# Patient Record
Sex: FEMALE | NOT HISPANIC OR LATINO | URBAN - METROPOLITAN AREA
[De-identification: names, ages, dates, MRNs, and addresses within clinical notes are randomized per-mention and may not be internally consistent; named-entity substitution may affect disease eponyms.]

---

## 2020-03-21 ENCOUNTER — INPATIENT (INPATIENT)
Facility: HOSPITAL | Age: 85
LOS: 2 days | Discharge: NURSING, REHAB OR LONG-TERM CARE | DRG: 66 | End: 2020-03-24
Attending: INTERNAL MEDICINE | Admitting: INTERNAL MEDICINE
Payer: MEDICARE

## 2020-03-21 VITALS
OXYGEN SATURATION: 96 % | HEIGHT: 65 IN | SYSTOLIC BLOOD PRESSURE: 131 MMHG | RESPIRATION RATE: 18 BRPM | WEIGHT: 139.99 LBS | HEART RATE: 68 BPM | TEMPERATURE: 97 F | DIASTOLIC BLOOD PRESSURE: 74 MMHG

## 2020-03-21 DIAGNOSIS — I63.311 CEREBRAL INFARCTION DUE TO THROMBOSIS OF RIGHT MIDDLE CEREBRAL ARTERY: ICD-10-CM

## 2020-03-21 LAB
ALBUMIN SERPL ELPH-MCNC: 3.1 G/DL — LOW (ref 3.5–5)
ALP SERPL-CCNC: 66 U/L — SIGNIFICANT CHANGE UP (ref 40–120)
ALT FLD-CCNC: 24 U/L DA — SIGNIFICANT CHANGE UP (ref 10–60)
ANION GAP SERPL CALC-SCNC: 6 MMOL/L — SIGNIFICANT CHANGE UP (ref 5–17)
AST SERPL-CCNC: 33 U/L — SIGNIFICANT CHANGE UP (ref 10–40)
BASOPHILS # BLD AUTO: 0.04 K/UL — SIGNIFICANT CHANGE UP (ref 0–0.2)
BASOPHILS NFR BLD AUTO: 0.4 % — SIGNIFICANT CHANGE UP (ref 0–2)
BILIRUB SERPL-MCNC: 1.1 MG/DL — SIGNIFICANT CHANGE UP (ref 0.2–1.2)
BUN SERPL-MCNC: 13 MG/DL — SIGNIFICANT CHANGE UP (ref 7–18)
CALCIUM SERPL-MCNC: 8.5 MG/DL — SIGNIFICANT CHANGE UP (ref 8.4–10.5)
CHLORIDE SERPL-SCNC: 101 MMOL/L — SIGNIFICANT CHANGE UP (ref 96–108)
CK SERPL-CCNC: 760 U/L — HIGH (ref 21–215)
CO2 SERPL-SCNC: 32 MMOL/L — HIGH (ref 22–31)
CREAT SERPL-MCNC: 0.76 MG/DL — SIGNIFICANT CHANGE UP (ref 0.5–1.3)
EOSINOPHIL # BLD AUTO: 0.01 K/UL — SIGNIFICANT CHANGE UP (ref 0–0.5)
EOSINOPHIL NFR BLD AUTO: 0.1 % — SIGNIFICANT CHANGE UP (ref 0–6)
GLUCOSE SERPL-MCNC: 91 MG/DL — SIGNIFICANT CHANGE UP (ref 70–99)
HCT VFR BLD CALC: 38.7 % — SIGNIFICANT CHANGE UP (ref 34.5–45)
HGB BLD-MCNC: 12.8 G/DL — SIGNIFICANT CHANGE UP (ref 11.5–15.5)
IMM GRANULOCYTES NFR BLD AUTO: 0.3 % — SIGNIFICANT CHANGE UP (ref 0–1.5)
LYMPHOCYTES # BLD AUTO: 1.9 K/UL — SIGNIFICANT CHANGE UP (ref 1–3.3)
LYMPHOCYTES # BLD AUTO: 17.3 % — SIGNIFICANT CHANGE UP (ref 13–44)
MCHC RBC-ENTMCNC: 31.4 PG — SIGNIFICANT CHANGE UP (ref 27–34)
MCHC RBC-ENTMCNC: 33.1 GM/DL — SIGNIFICANT CHANGE UP (ref 32–36)
MCV RBC AUTO: 94.9 FL — SIGNIFICANT CHANGE UP (ref 80–100)
MONOCYTES # BLD AUTO: 1.28 K/UL — HIGH (ref 0–0.9)
MONOCYTES NFR BLD AUTO: 11.7 % — SIGNIFICANT CHANGE UP (ref 2–14)
NEUTROPHILS # BLD AUTO: 7.7 K/UL — HIGH (ref 1.8–7.4)
NEUTROPHILS NFR BLD AUTO: 70.2 % — SIGNIFICANT CHANGE UP (ref 43–77)
NRBC # BLD: 0 /100 WBCS — SIGNIFICANT CHANGE UP (ref 0–0)
PLATELET # BLD AUTO: 145 K/UL — LOW (ref 150–400)
POTASSIUM SERPL-MCNC: 4 MMOL/L — SIGNIFICANT CHANGE UP (ref 3.5–5.3)
POTASSIUM SERPL-SCNC: 4 MMOL/L — SIGNIFICANT CHANGE UP (ref 3.5–5.3)
PROT SERPL-MCNC: 6.7 G/DL — SIGNIFICANT CHANGE UP (ref 6–8.3)
RBC # BLD: 4.08 M/UL — SIGNIFICANT CHANGE UP (ref 3.8–5.2)
RBC # FLD: 14.7 % — HIGH (ref 10.3–14.5)
SODIUM SERPL-SCNC: 139 MMOL/L — SIGNIFICANT CHANGE UP (ref 135–145)
WBC # BLD: 10.96 K/UL — HIGH (ref 3.8–10.5)
WBC # FLD AUTO: 10.96 K/UL — HIGH (ref 3.8–10.5)

## 2020-03-21 PROCEDURE — 71045 X-RAY EXAM CHEST 1 VIEW: CPT | Mod: 26

## 2020-03-21 PROCEDURE — 99285 EMERGENCY DEPT VISIT HI MDM: CPT

## 2020-03-21 PROCEDURE — 70450 CT HEAD/BRAIN W/O DYE: CPT | Mod: 26

## 2020-03-21 RX ORDER — ENOXAPARIN SODIUM 100 MG/ML
40 INJECTION SUBCUTANEOUS DAILY
Refills: 0 | Status: DISCONTINUED | OUTPATIENT
Start: 2020-03-21 | End: 2020-03-24

## 2020-03-21 NOTE — ED ADULT NURSE REASSESSMENT NOTE - NS ED NURSE REASSESS COMMENT FT1
No nursing intervention provided by documenting RN. Charge nurse Saran made aware that pt was not seen in timely manner due to load of RN patients assignment. Pt endorsed to nurse Hong. at 1900

## 2020-03-21 NOTE — ED PROVIDER NOTE - OBJECTIVE STATEMENT
85 y.o female with no PMHx and no PSHx presents to the ED c/o R arm and R leg weakness since yesterday with difficulty walking. Patient's relative called 911 yesterday and patient refused to go and then continued to be weak and then today she agreed to come to ED. Patient denies any headache, chest pain, shortness of breath, paresthesias or any other acute complaints. NKDA

## 2020-03-21 NOTE — ED PROVIDER NOTE - CLINICAL SUMMARY MEDICAL DECISION MAKING FREE TEXT BOX
Patient with stroke requires inpatient admission for evaluation . No TPA candidate due to onset more than 24 hours.

## 2020-03-21 NOTE — ED ADULT TRIAGE NOTE - CHIEF COMPLAINT QUOTE
biba s/p found on the floor unknown time . pt reports cannot remember event c/o weakness Rt side , noted w/ mild slurring

## 2020-03-21 NOTE — ED ADULT NURSE NOTE - NSIMPLEMENTINTERV_GEN_ALL_ED
Implemented All Universal Safety Interventions:  [place] to call system. Call bell, personal items and telephone within reach. Instruct patient to call for assistance. Room bathroom lighting operational. Non-slip footwear when patient is off stretcher. Physically safe environment: no spills, clutter or unnecessary equipment. Stretcher in lowest position, wheels locked, appropriate side rails in place.

## 2020-03-21 NOTE — ED PROVIDER NOTE - CARE PLAN
Principal Discharge DX:	Cerebrovascular accident (CVA) due to thrombosis of right middle cerebral artery

## 2020-03-22 DIAGNOSIS — D72.829 ELEVATED WHITE BLOOD CELL COUNT, UNSPECIFIED: ICD-10-CM

## 2020-03-22 DIAGNOSIS — I63.311 CEREBRAL INFARCTION DUE TO THROMBOSIS OF RIGHT MIDDLE CEREBRAL ARTERY: ICD-10-CM

## 2020-03-22 DIAGNOSIS — Z98.49 CATARACT EXTRACTION STATUS, UNSPECIFIED EYE: Chronic | ICD-10-CM

## 2020-03-22 DIAGNOSIS — G62.9 POLYNEUROPATHY, UNSPECIFIED: ICD-10-CM

## 2020-03-22 DIAGNOSIS — I10 ESSENTIAL (PRIMARY) HYPERTENSION: ICD-10-CM

## 2020-03-22 DIAGNOSIS — E78.5 HYPERLIPIDEMIA, UNSPECIFIED: ICD-10-CM

## 2020-03-22 DIAGNOSIS — Z98.890 OTHER SPECIFIED POSTPROCEDURAL STATES: Chronic | ICD-10-CM

## 2020-03-22 DIAGNOSIS — M19.90 UNSPECIFIED OSTEOARTHRITIS, UNSPECIFIED SITE: ICD-10-CM

## 2020-03-22 DIAGNOSIS — Z29.9 ENCOUNTER FOR PROPHYLACTIC MEASURES, UNSPECIFIED: ICD-10-CM

## 2020-03-22 LAB
ANION GAP SERPL CALC-SCNC: 12 MMOL/L — SIGNIFICANT CHANGE UP (ref 5–17)
ANISOCYTOSIS BLD QL: SLIGHT — SIGNIFICANT CHANGE UP
BASOPHILS # BLD AUTO: 0 K/UL — SIGNIFICANT CHANGE UP (ref 0–0.2)
BASOPHILS NFR BLD AUTO: 0 % — SIGNIFICANT CHANGE UP (ref 0–2)
BUN SERPL-MCNC: 12 MG/DL — SIGNIFICANT CHANGE UP (ref 7–18)
CALCIUM SERPL-MCNC: 8.6 MG/DL — SIGNIFICANT CHANGE UP (ref 8.4–10.5)
CHLORIDE SERPL-SCNC: 101 MMOL/L — SIGNIFICANT CHANGE UP (ref 96–108)
CHOLEST SERPL-MCNC: 171 MG/DL — SIGNIFICANT CHANGE UP (ref 10–199)
CO2 SERPL-SCNC: 28 MMOL/L — SIGNIFICANT CHANGE UP (ref 22–31)
CREAT SERPL-MCNC: 0.79 MG/DL — SIGNIFICANT CHANGE UP (ref 0.5–1.3)
EOSINOPHIL # BLD AUTO: 0.08 K/UL — SIGNIFICANT CHANGE UP (ref 0–0.5)
EOSINOPHIL NFR BLD AUTO: 1 % — SIGNIFICANT CHANGE UP (ref 0–6)
FERRITIN SERPL-MCNC: 65 NG/ML — SIGNIFICANT CHANGE UP (ref 15–150)
FOLATE SERPL-MCNC: 10.3 NG/ML — SIGNIFICANT CHANGE UP
GLUCOSE SERPL-MCNC: 86 MG/DL — SIGNIFICANT CHANGE UP (ref 70–99)
HBA1C BLD-MCNC: 6.3 % — HIGH (ref 4–5.6)
HCT VFR BLD CALC: 38.5 % — SIGNIFICANT CHANGE UP (ref 34.5–45)
HDLC SERPL-MCNC: 70 MG/DL — SIGNIFICANT CHANGE UP
HGB BLD-MCNC: 12.5 G/DL — SIGNIFICANT CHANGE UP (ref 11.5–15.5)
IRON SATN MFR SERPL: 28 % — SIGNIFICANT CHANGE UP (ref 15–50)
IRON SATN MFR SERPL: 83 UG/DL — SIGNIFICANT CHANGE UP (ref 40–150)
LIPID PNL WITH DIRECT LDL SERPL: 85 MG/DL — SIGNIFICANT CHANGE UP
LYMPHOCYTES # BLD AUTO: 1.94 K/UL — SIGNIFICANT CHANGE UP (ref 1–3.3)
LYMPHOCYTES # BLD AUTO: 23 % — SIGNIFICANT CHANGE UP (ref 13–44)
MAGNESIUM SERPL-MCNC: 2.3 MG/DL — SIGNIFICANT CHANGE UP (ref 1.6–2.6)
MANUAL SMEAR VERIFICATION: SIGNIFICANT CHANGE UP
MCHC RBC-ENTMCNC: 30.7 PG — SIGNIFICANT CHANGE UP (ref 27–34)
MCHC RBC-ENTMCNC: 32.5 GM/DL — SIGNIFICANT CHANGE UP (ref 32–36)
MCV RBC AUTO: 94.6 FL — SIGNIFICANT CHANGE UP (ref 80–100)
MICROCYTES BLD QL: SLIGHT — SIGNIFICANT CHANGE UP
MONOCYTES # BLD AUTO: 0.93 K/UL — HIGH (ref 0–0.9)
MONOCYTES NFR BLD AUTO: 11 % — SIGNIFICANT CHANGE UP (ref 2–14)
NEUTROPHILS # BLD AUTO: 5.32 K/UL — SIGNIFICANT CHANGE UP (ref 1.8–7.4)
NEUTROPHILS NFR BLD AUTO: 63 % — SIGNIFICANT CHANGE UP (ref 43–77)
NRBC # BLD: 0 /100 — SIGNIFICANT CHANGE UP (ref 0–0)
OVALOCYTES BLD QL SMEAR: SLIGHT — SIGNIFICANT CHANGE UP
PHOSPHATE SERPL-MCNC: 2.9 MG/DL — SIGNIFICANT CHANGE UP (ref 2.5–4.5)
PLAT MORPH BLD: NORMAL — SIGNIFICANT CHANGE UP
PLATELET # BLD AUTO: 160 K/UL — SIGNIFICANT CHANGE UP (ref 150–400)
POIKILOCYTOSIS BLD QL AUTO: SLIGHT — SIGNIFICANT CHANGE UP
POTASSIUM SERPL-MCNC: 3.6 MMOL/L — SIGNIFICANT CHANGE UP (ref 3.5–5.3)
POTASSIUM SERPL-SCNC: 3.6 MMOL/L — SIGNIFICANT CHANGE UP (ref 3.5–5.3)
RBC # BLD: 4.07 M/UL — SIGNIFICANT CHANGE UP (ref 3.8–5.2)
RBC # FLD: 14.9 % — HIGH (ref 10.3–14.5)
RBC BLD AUTO: ABNORMAL
SCHISTOCYTES BLD QL AUTO: SLIGHT — SIGNIFICANT CHANGE UP
SODIUM SERPL-SCNC: 141 MMOL/L — SIGNIFICANT CHANGE UP (ref 135–145)
T4 AB SER-ACNC: 10 UG/DL — SIGNIFICANT CHANGE UP (ref 4.6–12)
TIBC SERPL-MCNC: 292 UG/DL — SIGNIFICANT CHANGE UP (ref 250–450)
TOTAL CHOLESTEROL/HDL RATIO MEASUREMENT: 2.4 RATIO — LOW (ref 3.3–7.1)
TRIGL SERPL-MCNC: 78 MG/DL — SIGNIFICANT CHANGE UP (ref 10–149)
TROPONIN I SERPL-MCNC: <0.015 NG/ML — SIGNIFICANT CHANGE UP (ref 0–0.04)
TSH SERPL-MCNC: 1.36 UU/ML — SIGNIFICANT CHANGE UP (ref 0.34–4.82)
UIBC SERPL-MCNC: 209 UG/DL — SIGNIFICANT CHANGE UP (ref 110–370)
VARIANT LYMPHS # BLD: 2 % — SIGNIFICANT CHANGE UP (ref 0–6)
VIT B12 SERPL-MCNC: 329 PG/ML — SIGNIFICANT CHANGE UP (ref 232–1245)
WBC # BLD: 8.44 K/UL — SIGNIFICANT CHANGE UP (ref 3.8–10.5)
WBC # FLD AUTO: 8.44 K/UL — SIGNIFICANT CHANGE UP (ref 3.8–10.5)

## 2020-03-22 PROCEDURE — 99222 1ST HOSP IP/OBS MODERATE 55: CPT

## 2020-03-22 RX ORDER — ASPIRIN/CALCIUM CARB/MAGNESIUM 324 MG
81 TABLET ORAL DAILY
Refills: 0 | Status: DISCONTINUED | OUTPATIENT
Start: 2020-03-22 | End: 2020-03-24

## 2020-03-22 RX ORDER — ACETAMINOPHEN 500 MG
650 TABLET ORAL EVERY 6 HOURS
Refills: 0 | Status: DISCONTINUED | OUTPATIENT
Start: 2020-03-22 | End: 2020-03-24

## 2020-03-22 RX ORDER — ALBUTEROL 90 UG/1
2 AEROSOL, METERED ORAL EVERY 6 HOURS
Refills: 0 | Status: DISCONTINUED | OUTPATIENT
Start: 2020-03-22 | End: 2020-03-24

## 2020-03-22 RX ORDER — HYDRALAZINE HCL 50 MG
10 TABLET ORAL ONCE
Refills: 0 | Status: COMPLETED | OUTPATIENT
Start: 2020-03-22 | End: 2020-03-22

## 2020-03-22 RX ORDER — CLOPIDOGREL BISULFATE 75 MG/1
75 TABLET, FILM COATED ORAL DAILY
Refills: 0 | Status: DISCONTINUED | OUTPATIENT
Start: 2020-03-22 | End: 2020-03-24

## 2020-03-22 RX ORDER — ATORVASTATIN CALCIUM 80 MG/1
80 TABLET, FILM COATED ORAL AT BEDTIME
Refills: 0 | Status: DISCONTINUED | OUTPATIENT
Start: 2020-03-22 | End: 2020-03-24

## 2020-03-22 RX ADMIN — ATORVASTATIN CALCIUM 80 MILLIGRAM(S): 80 TABLET, FILM COATED ORAL at 21:56

## 2020-03-22 RX ADMIN — Medication 10 MILLIGRAM(S): at 20:57

## 2020-03-22 RX ADMIN — ENOXAPARIN SODIUM 40 MILLIGRAM(S): 100 INJECTION SUBCUTANEOUS at 12:05

## 2020-03-22 RX ADMIN — Medication 650 MILLIGRAM(S): at 22:47

## 2020-03-22 RX ADMIN — ALBUTEROL 2 PUFF(S): 90 AEROSOL, METERED ORAL at 12:04

## 2020-03-22 RX ADMIN — CLOPIDOGREL BISULFATE 75 MILLIGRAM(S): 75 TABLET, FILM COATED ORAL at 12:04

## 2020-03-22 RX ADMIN — Medication 650 MILLIGRAM(S): at 23:00

## 2020-03-22 RX ADMIN — Medication 81 MILLIGRAM(S): at 12:04

## 2020-03-22 NOTE — PHYSICAL THERAPY INITIAL EVALUATION ADULT - GENERAL OBSERVATIONS, REHAB EVAL
Consult received,EMR, radiology and labs reviewed. Patient received supine in bed, NAD, right hand  weakness. Patient agreed to EVALUATION from Physical Therapist.

## 2020-03-22 NOTE — H&P ADULT - NEUROLOGICAL DETAILS
sensation intact/deep reflexes intact/cranial nerves intact/alert and oriented x 3/responds to verbal commands

## 2020-03-22 NOTE — CONSULT NOTE ADULT - SUBJECTIVE AND OBJECTIVE BOX
Patient is a 85y old  Female who presents with a chief complaint of Right Hand Weakness (22 Mar 2020 00:34)      HPI: About 3 dyas prior to presenting to the ED she noticed right arm numbness and weakness; it did not improve and kept her from sleep. She denies headache chest pain  At baseline she uses a walker because of "neuropathy' in th elegs.  PAST MEDICAL & SURGICAL HISTORY:  Hyperlipidemia  Hypertension  Neuropathy  Arthritis  S/P excision of lipoma: Right Shoulder  S/P cataract surgery: Right Eye      FAMILY HISTORY:  FH: coronary artery disease: Father  FH: arthritis: Father, Mother        Social Hx:  Nonsmoker, no drug or alcohol use    MEDICATIONS  (STANDING):  aspirin  chewable 81 milliGRAM(s) Oral daily  atorvastatin 80 milliGRAM(s) Oral at bedtime  clopidogrel Tablet 75 milliGRAM(s) Oral daily  enoxaparin Injectable 40 milliGRAM(s) SubCutaneous daily       Allergies    No Known Allergies    Intolerances        ROS: Pertinent positives in HPI, all other ROS were reviewed and are negative.      Vital Signs Last 24 Hrs  T(C): 36.7 (22 Mar 2020 11:41), Max: 36.7 (22 Mar 2020 05:34)  T(F): 98 (22 Mar 2020 11:41), Max: 98 (22 Mar 2020 05:34)  HR: 85 (22 Mar 2020 11:41) (62 - 94)  BP: 126/64 (22 Mar 2020 11:41) (126/64 - 150/58)  BP(mean): --  RR: 18 (22 Mar 2020 11:41) (18 - 18)  SpO2: 98% (22 Mar 2020 11:41) (96% - 100%)        Constitutional: awake and alert.  HEENT: PERRLA, EOMI,   Neck: Supple.  Respiratory: Breath sounds are clear bilaterally  Cardiovascular: S1 and S2, regular rhythm  Gastrointestinal: soft, nontender  Extremities:  no edema  Vascular: Carotid Bruit - no  Musculoskeletal: no joint swelling/tenderness, no abnormal movements  Skin: No rashes    Neurological exam:  HF: A x O x 3. Appropriately interactive, normal affect. Speech fluent, No Aphasia or paraphasic errors. Naming /repetition intact   CN: PAMELA, EOMI, VFF, facial sensation normal, no NLFD, tongue midline, Palate moves equally, SCM equal bilaterally  Motor: No pronator drift, Strength 5/5 in all 4 ext, normal bulk and tone, no tremor, rigidity or bradykinesia.    Sens: I diminished slightly RUE and intact elshere to LT/ PP/ JS  weak VS in toes ankles  Reflexes: Symmetric and normal . BJ 2+, BR 2+,Left KJ 2+, Right KJ 0; bilateral AJ 0+, downgoing toes b/l  Coord:  No FNFA, dysmetria, ASHLEY intact   Gait/Balance: assisted with walker and 1     NIHSS: 7    1A: Level of consciousness       0= Alert; keenly responsive        1B: Ask month and age       0= Both questions right       1C: "Blink eyes" and "Squeeze Hands"       0= Performs both         2: Horizontal EOMs       0= Normal      3: Visual fields       0= No visual loss      4: Facial palsy (use grimace if obtunded)       +1= Minor paralysis (flat NLF, smile asymmetry)        5A: Left arm motor drift (count out loud and use fingers to show count)       0= No drift x 10 seconds         5B: Right arm motor drift        +1= Drift but doesn't hit bed    6A: Left leg motor drift       0= No drift x 10 seconds       +1= Drift but doesn't hit bed    6B: Right leg motor drift       +2= Drift, hits bed       7: Limb ataxia (FNF/heel-shin)       0= No ataxia    8: Sensation       +1= mild-moderate loss: less sharp/more dull     9: Language/aphasia- describe the scene (on citlali); name the items; read the sentences (on citlali)       0= Normal, no aphasia      10: Dysarthria- read the words       0= Normal      11: Extinction/inattention       0= No abnormality       MRS 3    Labs:                        12.5   8.44  )-----------( 160      ( 22 Mar 2020 07:05 )             38.5     03-22    141  |  101  |  12  ----------------------------<  86  3.6   |  28  |  0.79    Ca    8.6      22 Mar 2020 07:05  Phos  2.9     03-22  Mg     2.3     03-22    TPro  6.7  /  Alb  3.1<L>  /  TBili  1.1  /  DBili  x   /  AST  33  /  ALT  24  /  AlkPhos  66  03-21 03-22 Chol 171 LDL 85 HDL 70 Trig 78      Radiology report:  - CT head:  < from: CT Head No Cont (03.21.20 @ 16:35) >  EXAM:  CT BRAIN                            PROCEDURE DATE:  03/21/2020          INTERPRETATION:  INDICATION:  Altered mental status  TECHNIQUE:  A non contrast 2.5mm axial CT study of the brain was performed from skull base to vertex. Coronal and sagittal reformations were generated from the axial data.  COMPARISON EXAMINATION:  no prior.    FINDINGS:      HEMISPHERES:An acute small left MCA infarct is noted, with focal thrombus in a opercular branch but without thrombus in the left M1 or proximal M2 region. Otherwise there are scattered chronic ischemic changes in both hemispheres with involutional change. VENTRICLES:  Midline and normal in size.  POSTERIOR FOSSA:  The brain stem and cerebellum are unremarkable.  No CP angle lesion noted.  EXTRACEREBRAL SPACES:  No subdural or epidural collections are noted.  SKULL BASE AND CALVARIUM:  Appears intact.  No fracture or destructive lesion is identified.  SINUSES AND MASTOIDS:  Clear.  MISCELLANEOUS:  No orbital or suprasellar abnormality noted.      IMPRESSION:    1)  small acute left MCA territory infarct in the left frontal region, with focal thrombus in a single frontal opercular branch.  2)  no intracerebral hemorrhage or space-occupying lesion noted. Mild underlying chronic ischemic changes.    Follow-up MRI and MRA imaging may be considered for further assessment.    Dr. Lopez notified at 4:40 PM..        ALLA BHANDARI M.D., ATTENDING RADIOLOGIST  This document has been electronically signed. Mar 21 2020  4:40PM    < end of copied text >

## 2020-03-22 NOTE — H&P ADULT - PROBLEM SELECTOR PLAN 1
Right Wrist Drop   NIHSS: 7  Outside of tPA window  small acute left MCA territory infarct in the left frontal region, with focal thrombus in a single frontal opercular branch  f/u ECHO [ ] Carotid Doppler [ ]  f/u PT  MRI Screening in Chart   Consult: Neurology   c/w Aspirin, Plavix, and Atorvastatin Right Wrist Drop   NIHSS: 7  Outside of tPA window  small acute left MCA territory infarct in the left frontal region, with focal thrombus in a single frontal opercular branch  f/u ECHO [ ] Carotid Doppler [ ]  f/u PT  MRI Screening in Chart   c/w Aspirin, Plavix, and Atorvastatin

## 2020-03-22 NOTE — PHYSICAL THERAPY INITIAL EVALUATION ADULT - WORK/LEISURE ACTIVITY, REHAB EVAL
Cyclobenzaprine Approved    Prior Authorization Number: [id number]  Dates of approval: July 27, 2018 to July 27, 2019  Filling Pharmacy: [facility]  Additional Information: Per pharmacy, received payable claim and will notify patient when ready for .       independent

## 2020-03-22 NOTE — H&P ADULT - HISTORY OF PRESENT ILLNESS
Patient is an 85F that lives alone in her apartment that utilizes a walker with a PMHx of Arthritis, Neuropathy, Hypertension, and Hyperlipidemia and PSurgHx of Right Cataract surgery and s/p Right shoulder Lipoma presenting with right hand weakness. Patient states 3 days prior to admission she was in her usual state of health until she noticed that her right hand went limp and that she was unable to feel her hand. Her niece came to visit her and asked that she go to the doctor and the patient refused. Over the course of the night she was having difficulty sleeping because her hand strength and mobility was not improving and the following day she felt it would be appropriate to go to the hospital. Patient denies headaches, dizzines, chest pain, dysuria, n/v/d/c, abdominal pain, myalgias, and vision impairment.     NIHSS: 7    GOC: DNR/DNI

## 2020-03-22 NOTE — CONSULT NOTE ADULT - ASSESSMENT
A/P: Acute left MCA ischemic infarct due to thrombus or embolus; She also has weakness in the left leg with brisk reflexes that has been called "peripheral neuropathy" and has been using a walker for some time; The absent right knee and ankle reflex may reflect neuropathy or lumbar stenosis. The left side knee reflex is brisk that likely represents a normal reflex particularly in view of the normal plantar reflex. All the leg problems that are chronic and may be followed up in the outpatient office of a neurologist, but she does also have an acute ischemic infarct that began 3 days ago    - No IV tpa given because time of onset was 3 days prior to presentation  - ASA/ PLavix  - Statin  - DVT prophylaxis  - PT eval    Total Critical Care Time spent: 35 minutes

## 2020-03-22 NOTE — H&P ADULT - PROBLEM SELECTOR PLAN 7
IMPROVE VTE Individual Risk Assessment          RISK                                                          Points  [  ] Previous VTE                                                3  [  ] Thrombophilia                                             2  [  ] Lower limb paralysis                                   2        (unable to hold up >15 seconds)    [  ] Current Cancer                                             2         (within 6 months)  [  ] Immobilization > 24 hrs                              1  [  ] ICU/CCU stay > 24 hours                             1  [  ] Age > 60                                                         1    IMPROVE VTE Score: 4    Lovenox 40 mg s.c.

## 2020-03-22 NOTE — PHYSICAL THERAPY INITIAL EVALUATION ADULT - ACTIVE RANGE OF MOTION EXAMINATION, REHAB EVAL
bilateral upper extremity Active ROM was WFL (within functional limits)/bilateral  lower extremity Active ROM was WFL (within functional limits)/grossly assessed WFL AROM except Right wrist AAROM

## 2020-03-22 NOTE — H&P ADULT - NSHPPHYSICALEXAM_GEN_ALL_CORE
ICU Vital Signs Last 24 Hrs  T(C): 36.6 (22 Mar 2020 00:43), Max: 36.6 (22 Mar 2020 00:43)  T(F): 97.9 (22 Mar 2020 00:43), Max: 97.9 (22 Mar 2020 00:43)  HR: 94 (22 Mar 2020 00:43) (68 - 94)  BP: 140/59 (22 Mar 2020 00:43) (131/74 - 140/59)  BP(mean): --  ABP: --  ABP(mean): --  RR: 18 (22 Mar 2020 00:43) (18 - 18)  SpO2: 100% (22 Mar 2020 00:43) (96% - 100%)

## 2020-03-22 NOTE — H&P ADULT - ASSESSMENT
Patient is an 85F that lives alone in her apartment that utilizes a walker with a PMHx of Arthritis, Neuropathy, Hypertension, and Hyperlipidemia and PSurgHx of Right Cataract surgery and s/p Right shoulder Lipoma presenting with right hand weakness.    GOC: DNR/DNI    NIHSS: 7    ED    Creatinine Kinase: 760  WBC: 10.96  CT Head  1)  small acute left MCA territory infarct in the left frontal region, with focal thrombus in a single frontal opercular branch.

## 2020-03-22 NOTE — H&P ADULT - NSHPSOCIALHISTORY_GEN_ALL_CORE
20 years 1 ppd, Currently not smoking. No alcohol or drugs.     Patient lives alone in an apartment and utilizes a walker.     HCP: Jessie Islas (Cousin - Doctor)    GOC: DNR/DNI

## 2020-03-23 LAB
ANION GAP SERPL CALC-SCNC: 7 MMOL/L — SIGNIFICANT CHANGE UP (ref 5–17)
BASOPHILS # BLD AUTO: SIGNIFICANT CHANGE UP K/UL (ref 0–0.2)
BASOPHILS NFR BLD AUTO: SIGNIFICANT CHANGE UP % (ref 0–2)
BUN SERPL-MCNC: 16 MG/DL — SIGNIFICANT CHANGE UP (ref 7–18)
CALCIUM SERPL-MCNC: 8.3 MG/DL — LOW (ref 8.4–10.5)
CHLORIDE SERPL-SCNC: 103 MMOL/L — SIGNIFICANT CHANGE UP (ref 96–108)
CO2 SERPL-SCNC: 30 MMOL/L — SIGNIFICANT CHANGE UP (ref 22–31)
CREAT SERPL-MCNC: 0.72 MG/DL — SIGNIFICANT CHANGE UP (ref 0.5–1.3)
EOSINOPHIL # BLD AUTO: SIGNIFICANT CHANGE UP K/UL (ref 0–0.5)
EOSINOPHIL NFR BLD AUTO: SIGNIFICANT CHANGE UP % (ref 0–6)
GLUCOSE SERPL-MCNC: 90 MG/DL — SIGNIFICANT CHANGE UP (ref 70–99)
HCT VFR BLD CALC: 36.2 % — SIGNIFICANT CHANGE UP (ref 34.5–45)
HGB BLD-MCNC: 11.7 G/DL — SIGNIFICANT CHANGE UP (ref 11.5–15.5)
IMM GRANULOCYTES NFR BLD AUTO: SIGNIFICANT CHANGE UP % (ref 0–1.5)
LYMPHOCYTES # BLD AUTO: SIGNIFICANT CHANGE UP % (ref 13–44)
LYMPHOCYTES # BLD AUTO: SIGNIFICANT CHANGE UP K/UL (ref 1–3.3)
MAGNESIUM SERPL-MCNC: 2.4 MG/DL — SIGNIFICANT CHANGE UP (ref 1.6–2.6)
MCHC RBC-ENTMCNC: 30.6 PG — SIGNIFICANT CHANGE UP (ref 27–34)
MCHC RBC-ENTMCNC: 32.3 GM/DL — SIGNIFICANT CHANGE UP (ref 32–36)
MCV RBC AUTO: 94.8 FL — SIGNIFICANT CHANGE UP (ref 80–100)
MONOCYTES # BLD AUTO: SIGNIFICANT CHANGE UP K/UL (ref 0–0.9)
MONOCYTES NFR BLD AUTO: SIGNIFICANT CHANGE UP % (ref 2–14)
NEUTROPHILS # BLD AUTO: SIGNIFICANT CHANGE UP K/UL (ref 1.8–7.4)
NEUTROPHILS NFR BLD AUTO: SIGNIFICANT CHANGE UP % (ref 43–77)
NRBC # BLD: 0 /100 WBCS — SIGNIFICANT CHANGE UP (ref 0–0)
PHOSPHATE SERPL-MCNC: 3.1 MG/DL — SIGNIFICANT CHANGE UP (ref 2.5–4.5)
PLATELET # BLD AUTO: 152 K/UL — SIGNIFICANT CHANGE UP (ref 150–400)
POTASSIUM SERPL-MCNC: 2.9 MMOL/L — CRITICAL LOW (ref 3.5–5.3)
POTASSIUM SERPL-SCNC: 2.9 MMOL/L — CRITICAL LOW (ref 3.5–5.3)
RBC # BLD: 3.82 M/UL — SIGNIFICANT CHANGE UP (ref 3.8–5.2)
RBC # FLD: 15 % — HIGH (ref 10.3–14.5)
SODIUM SERPL-SCNC: 140 MMOL/L — SIGNIFICANT CHANGE UP (ref 135–145)
WBC # BLD: 7.62 K/UL — SIGNIFICANT CHANGE UP (ref 3.8–10.5)
WBC # FLD AUTO: 7.62 K/UL — SIGNIFICANT CHANGE UP (ref 3.8–10.5)

## 2020-03-23 PROCEDURE — 93880 EXTRACRANIAL BILAT STUDY: CPT | Mod: 26

## 2020-03-23 RX ORDER — CLOPIDOGREL BISULFATE 75 MG/1
1 TABLET, FILM COATED ORAL
Qty: 30 | Refills: 0
Start: 2020-03-23 | End: 2020-04-21

## 2020-03-23 RX ORDER — ATORVASTATIN CALCIUM 80 MG/1
1 TABLET, FILM COATED ORAL
Qty: 30 | Refills: 0
Start: 2020-03-23 | End: 2020-04-21

## 2020-03-23 RX ORDER — POTASSIUM CHLORIDE 20 MEQ
40 PACKET (EA) ORAL EVERY 4 HOURS
Refills: 0 | Status: COMPLETED | OUTPATIENT
Start: 2020-03-23 | End: 2020-03-23

## 2020-03-23 RX ORDER — ASPIRIN/CALCIUM CARB/MAGNESIUM 324 MG
1 TABLET ORAL
Qty: 30 | Refills: 0
Start: 2020-03-23 | End: 2020-04-21

## 2020-03-23 RX ORDER — POTASSIUM CHLORIDE 20 MEQ
40 PACKET (EA) ORAL
Refills: 0 | Status: DISCONTINUED | OUTPATIENT
Start: 2020-03-23 | End: 2020-03-23

## 2020-03-23 RX ADMIN — ATORVASTATIN CALCIUM 80 MILLIGRAM(S): 80 TABLET, FILM COATED ORAL at 21:33

## 2020-03-23 RX ADMIN — Medication 81 MILLIGRAM(S): at 12:45

## 2020-03-23 RX ADMIN — Medication 40 MILLIEQUIVALENT(S): at 21:33

## 2020-03-23 RX ADMIN — CLOPIDOGREL BISULFATE 75 MILLIGRAM(S): 75 TABLET, FILM COATED ORAL at 12:45

## 2020-03-23 RX ADMIN — Medication 40 MILLIEQUIVALENT(S): at 15:41

## 2020-03-23 RX ADMIN — Medication 40 MILLIEQUIVALENT(S): at 11:01

## 2020-03-23 RX ADMIN — ENOXAPARIN SODIUM 40 MILLIGRAM(S): 100 INJECTION SUBCUTANEOUS at 12:34

## 2020-03-23 NOTE — SWALLOW BEDSIDE ASSESSMENT ADULT - ASR SWALLOW ASPIRATION MONITOR
upper respiratory infection/gurgly voice/change of breathing pattern/throat clearing/position upright (90Y)/cough/oral hygiene/fever/pneumonia

## 2020-03-23 NOTE — SWALLOW BEDSIDE ASSESSMENT ADULT - SLP PERTINENT HISTORY OF CURRENT PROBLEM
Pt is an 85F that lives alone in her apartment that utilizes a walker with a PMHx of Arthritis, Neuropathy, Hypertension, and Hyperlipidemia and PSurgHx of Right Cataract surgery and s/p Right shoulder Lipoma presenting with right hand weakness.CXR indicates the lungs are free of consolidation.

## 2020-03-23 NOTE — PROGRESS NOTE ADULT - SUBJECTIVE AND OBJECTIVE BOX
PGY1 Note discussed with supervising resident and primary attending.    Patient is a 85y old  Female who presents with a chief complaint of Right Hand Weakness (22 Mar 2020 12:02)      INTERVAL HPI/OVERNIGHT EVENTS: Patient seen and examined at bedside. No acute events.     MEDICATIONS  (STANDING):  aspirin  chewable 81 milliGRAM(s) Oral daily  atorvastatin 80 milliGRAM(s) Oral at bedtime  clopidogrel Tablet 75 milliGRAM(s) Oral daily  enoxaparin Injectable 40 milliGRAM(s) SubCutaneous daily  potassium chloride   Powder 40 milliEquivalent(s) Oral every 2 hours    MEDICATIONS  (PRN):  acetaminophen   Tablet .. 650 milliGRAM(s) Oral every 6 hours PRN Temp greater or equal to 38C (100.4F), Moderate Pain (4 - 6)  ALBUTerol    90 MICROgram(s) HFA Inhaler 2 Puff(s) Inhalation every 6 hours PRN Shortness of Breath and/or Wheezing      Allergies    No Known Allergies    Intolerances        REVIEW OF SYSTEMS:  CONSTITUTIONAL: No fever, weight loss, or fatigue  RESPIRATORY: No cough, wheezing, chills or hemoptysis; No shortness of breath  CARDIOVASCULAR: No chest pain, palpitations, dizziness, or leg swelling  GASTROINTESTINAL: No abdominal or epigastric pain. No nausea, vomiting, or hematemesis; No diarrhea or constipation. No melena or hematochezia.  NEUROLOGICAL: No headaches, memory loss, loss of strength, numbness, or tremors  SKIN: No itching, burning, rashes, or lesions     Vital Signs Last 24 Hrs  T(C): 36.4 (23 Mar 2020 07:40), Max: 37.9 (22 Mar 2020 19:57)  T(F): 97.6 (23 Mar 2020 07:40), Max: 100.3 (22 Mar 2020 19:57)  HR: 90 (23 Mar 2020 07:40) (68 - 92)  BP: 143/70 (23 Mar 2020 07:40) (112/83 - 151/75)  BP(mean): --  RR: 18 (23 Mar 2020 07:40) (18 - 18)  SpO2: 95% (23 Mar 2020 07:40) (95% - 99%)    PHYSICAL EXAM:  GENERAL: NAD, well-groomed, well-developed  HEAD:  Atraumatic, Normocephalic  EYES: EOMI, PERRLA, conjunctiva and sclera clear  NECK: Supple, No JVD, Normal thyroid  CHEST/LUNG: Clear to percussion bilaterally; No rales, rhonchi, wheezing, or rubs  HEART: Regular rate and rhythm; No murmurs, rubs, or gallops  ABDOMEN: Soft, Nontender, Nondistended; Bowel sounds present  NERVOUS SYSTEM:  Alert & Oriented X3, Good concentration; Motor Strength 5/5 B/L   EXTREMITIES:  2+ Peripheral Pulses, No clubbing, cyanosis, or edema  SKIN;    LABS:                        11.7   7.62  )-----------( 152      ( 23 Mar 2020 06:18 )             36.2     03-23    140  |  103  |  16  ----------------------------<  90  2.9<LL>   |  30  |  0.72    Ca    8.3<L>      23 Mar 2020 06:18  Phos  3.1     03-23  Mg     2.4     03-23    TPro  6.7  /  Alb  3.1<L>  /  TBili  1.1  /  DBili  x   /  AST  33  /  ALT  24  /  AlkPhos  66  03-21        CAPILLARY BLOOD GLUCOSE          RADIOLOGY & ADDITIONAL TESTS:    Imaging Personally Reviewed:  [ ] YES  [ ] NO    Consultant(s) Notes Reviewed:  [ ] YES  [ ] NO PGY1 Note discussed with supervising resident and primary attending.    Patient is a 85y old  Female who presents with a chief complaint of Right Hand Weakness (22 Mar 2020 12:02)      INTERVAL HPI/OVERNIGHT EVENTS: Patient seen and examined at bedside. No acute events. Pt reports improvement in R hand weakness.     MEDICATIONS  (STANDING):  aspirin  chewable 81 milliGRAM(s) Oral daily  atorvastatin 80 milliGRAM(s) Oral at bedtime  clopidogrel Tablet 75 milliGRAM(s) Oral daily  enoxaparin Injectable 40 milliGRAM(s) SubCutaneous daily  potassium chloride   Powder 40 milliEquivalent(s) Oral every 2 hours    MEDICATIONS  (PRN):  acetaminophen   Tablet .. 650 milliGRAM(s) Oral every 6 hours PRN Temp greater or equal to 38C (100.4F), Moderate Pain (4 - 6)  ALBUTerol    90 MICROgram(s) HFA Inhaler 2 Puff(s) Inhalation every 6 hours PRN Shortness of Breath and/or Wheezing      Allergies    No Known Allergies    Intolerances        REVIEW OF SYSTEMS:  CONSTITUTIONAL: No fever, weight loss, or fatigue  RESPIRATORY: No cough, wheezing, chills or hemoptysis; No shortness of breath  CARDIOVASCULAR: No chest pain, palpitations, dizziness, or leg swelling  GASTROINTESTINAL: No abdominal or epigastric pain. No nausea, vomiting, or hematemesis; No diarrhea or constipation. No melena or hematochezia.  NEUROLOGICAL: No headaches, memory loss, loss of strength, numbness, or tremors  SKIN: No itching, burning, rashes, or lesions     Vital Signs Last 24 Hrs  T(C): 36.4 (23 Mar 2020 07:40), Max: 37.9 (22 Mar 2020 19:57)  T(F): 97.6 (23 Mar 2020 07:40), Max: 100.3 (22 Mar 2020 19:57)  HR: 90 (23 Mar 2020 07:40) (68 - 92)  BP: 143/70 (23 Mar 2020 07:40) (112/83 - 151/75)  BP(mean): --  RR: 18 (23 Mar 2020 07:40) (18 - 18)  SpO2: 95% (23 Mar 2020 07:40) (95% - 99%)    PHYSICAL EXAM:  GENERAL: NAD, well-groomed, well-developed  HEAD:  Atraumatic, Normocephalic  EYES: EOMI, PERRLA, conjunctiva and sclera clear  NECK: Supple, No JVD, Normal thyroid  CHEST/LUNG: Clear to percussion bilaterally; No rales, rhonchi, wheezing, or rubs  HEART: Regular rate and rhythm; No murmurs, rubs, or gallops  ABDOMEN: Soft, Nontender, Nondistended; Bowel sounds present  NERVOUS SYSTEM:  Alert & Oriented X3, Good concentration; Motor Strength 5/5 B/L   EXTREMITIES:  2+ Peripheral Pulses, No clubbing, cyanosis, or edema  SKIN;    LABS:                        11.7   7.62  )-----------( 152      ( 23 Mar 2020 06:18 )             36.2     03-23    140  |  103  |  16  ----------------------------<  90  2.9<LL>   |  30  |  0.72    Ca    8.3<L>      23 Mar 2020 06:18  Phos  3.1     03-23  Mg     2.4     03-23    TPro  6.7  /  Alb  3.1<L>  /  TBili  1.1  /  DBili  x   /  AST  33  /  ALT  24  /  AlkPhos  66  03-21        CAPILLARY BLOOD GLUCOSE          RADIOLOGY & ADDITIONAL TESTS:    Imaging Personally Reviewed:  [ ] YES  [ ] NO    Consultant(s) Notes Reviewed:  [ ] YES  [ ] NO

## 2020-03-23 NOTE — DISCHARGE NOTE PROVIDER - NSDCMRMEDTOKEN_GEN_ALL_CORE_FT
aspirin 81 mg oral tablet, chewable: 1 tab(s) orally once a day  atorvastatin 80 mg oral tablet: 1 tab(s) orally once a day (at bedtime)  clopidogrel 75 mg oral tablet: 1 tab(s) orally once a day

## 2020-03-23 NOTE — PROGRESS NOTE ADULT - PROBLEM SELECTOR PLAN 1
Right Wrist Drop   Outside of tPA window  small acute left MCA territory infarct in the left frontal region, with focal thrombus in a single frontal opercular branch  f/u ECHO [ ] Carotid Doppler [ ]  f/u PT  MRI Screening in Chart   c/w Aspirin, Plavix, and Atorvastatin p/w Right Wrist Drop   CT Head: small acute left MCA territory infarct in the left frontal region, with focal thrombus in a single frontal opercular branch  Echo: G2DD  f/u Carotid Doppler   Outside of tPA window  c/w Aspirin, Plavix, and Atorvastatin

## 2020-03-23 NOTE — SWALLOW BEDSIDE ASSESSMENT ADULT - ADDITIONAL RECOMMENDATIONS
Pt's speech intelligibility is mildly impacted due to left sided facial droop and mild word finding difficulties. Speech therapy is warranted in helping the patient return to prior level of participation in ADLs

## 2020-03-23 NOTE — SWALLOW BEDSIDE ASSESSMENT ADULT - SWALLOW EVAL: RECOMMENDED FEEDING/EATING TECHNIQUES
allow for swallow between intakes/position upright (90 degrees)/small sips/bites/alternate food with liquid/oral hygiene

## 2020-03-23 NOTE — DISCHARGE NOTE PROVIDER - HOSPITAL COURSE
Patient is an 85F that lives alone in her apartment that utilizes a walker with a PMHx of Arthritis, Neuropathy, Hypertension, and Hyperlipidemia and PSurgHx of Right Cataract surgery and s/p Right shoulder Lipoma presenting with right hand weakness. Pt was admitted for concern of CVA.     Neurologist Dr. Lazo was consulted        CT Head: small acute left MCA territory infarct in the left frontal region, with focal thrombus in a single frontal opercular branch    No IV tpa given because time of onset was 3 days prior to presentation.    Echocardiogram showed G2DD.    Carotid doppler was normal.    Pt was started on aspirin, plavix and statin.     PT recommended NAN.     Overall condition improved. Pt ready for discharge. Patient is an 85F that lives alone in her apartment that utilizes a walker with a PMHx of Arthritis, Neuropathy, Hypertension, and Hyperlipidemia and PSurgHx of Right Cataract surgery and s/p Right shoulder Lipoma presenting with right hand weakness. Pt was admitted for concern of CVA.     Neurologist Dr. Lazo was consulted        CT Head: small acute left MCA territory infarct in the left frontal region, with focal thrombus in a single frontal opercular branch    No IV tpa given because time of onset was 3 days prior to presentation.    Echocardiogram showed G2DD.    Carotid doppler was normal.    Pt was started on aspirin, plavix and statin.     PT recommended NAN.     Overall condition improved. Pt clinically stable for discharge.

## 2020-03-23 NOTE — SWALLOW BEDSIDE ASSESSMENT ADULT - COMMENTS
Pt. received seated upright on chair w/ tray table on lap,  AA+OX3, alert, awake, and oriented. Left sided facial droop observed. Unremarkable vocal quality; however, speech intelligibility is mildly impacted due to left sided facial droop and mild word finding difficulties. Pt answered all questions and followed directions. PO trials of soft solids & thin liquids administered. Pt refused regular diet 2/2 missing dentures. Discussed findings with Dr. Nelson (paged).

## 2020-03-23 NOTE — DISCHARGE NOTE PROVIDER - NSDCCPCAREPLAN_GEN_ALL_CORE_FT
PRINCIPAL DISCHARGE DIAGNOSIS  Diagnosis: Cerebrovascular accident (CVA)  Assessment and Plan of Treatment: You came to the hospital due to right hand weakness. You were admitted for stroke. Neurologist Dr. Lazo was consulted  CT Head showedsmall acute left MCA territory infarct in the left frontal region, with focal thrombus in a single frontal opercular branch  No IV tpa was given because time of onset was 3 days prior to presentation. Echocardiogram showed Grade 2 diastolic dysfunction. Carotid doppler was normal. You were started on aspirin, plavix and statin. Physical therapy recommended subacute rehab. Follow with yur PCP within 1 week of discharge.      SECONDARY DISCHARGE DIAGNOSES  Diagnosis: Hypertension, unspecified type  Assessment and Plan of Treatment: Your blood pressure is controlled without any blood pressure medication. Maintain a healthy diet that consist of low sugar, low fat, low sodium diet. Exercise frequently if possible.      Diagnosis: Hyperlipidemia, unspecified hyperlipidemia type  Assessment and Plan of Treatment: You are advised to take high dose statin. Take healthy diet and excercise regularly.

## 2020-03-23 NOTE — PROGRESS NOTE ADULT - PROBLEM SELECTOR PLAN 6
WBC: 10.96  Unknown origin IMPROVE VTE Individual Risk Assessment          RISK                                                          Points  [  ] Previous VTE                                                3  [  ] Thrombophilia                                             2  [  ] Lower limb paralysis                                   2        (unable to hold up >15 seconds)    [  ] Current Cancer                                             2         (within 6 months)  [  ] Immobilization > 24 hrs                              1  [  ] ICU/CCU stay > 24 hours                             1  [  ] Age > 60                                                         1    IMPROVE VTE Score: 4    Lovenox 40 mg s.c.

## 2020-03-24 VITALS
OXYGEN SATURATION: 95 % | TEMPERATURE: 99 F | DIASTOLIC BLOOD PRESSURE: 71 MMHG | RESPIRATION RATE: 18 BRPM | HEART RATE: 103 BPM | SYSTOLIC BLOOD PRESSURE: 129 MMHG

## 2020-03-24 LAB
ANION GAP SERPL CALC-SCNC: 8 MMOL/L — SIGNIFICANT CHANGE UP (ref 5–17)
BASOPHILS # BLD AUTO: SIGNIFICANT CHANGE UP K/UL (ref 0–0.2)
BASOPHILS NFR BLD AUTO: SIGNIFICANT CHANGE UP % (ref 0–2)
BUN SERPL-MCNC: 11 MG/DL — SIGNIFICANT CHANGE UP (ref 7–18)
CALCIUM SERPL-MCNC: 8.9 MG/DL — SIGNIFICANT CHANGE UP (ref 8.4–10.5)
CHLORIDE SERPL-SCNC: 105 MMOL/L — SIGNIFICANT CHANGE UP (ref 96–108)
CO2 SERPL-SCNC: 30 MMOL/L — SIGNIFICANT CHANGE UP (ref 22–31)
CREAT SERPL-MCNC: 0.72 MG/DL — SIGNIFICANT CHANGE UP (ref 0.5–1.3)
EOSINOPHIL # BLD AUTO: SIGNIFICANT CHANGE UP K/UL (ref 0–0.5)
EOSINOPHIL NFR BLD AUTO: SIGNIFICANT CHANGE UP % (ref 0–6)
GLUCOSE SERPL-MCNC: 99 MG/DL — SIGNIFICANT CHANGE UP (ref 70–99)
HCT VFR BLD CALC: 38 % — SIGNIFICANT CHANGE UP (ref 34.5–45)
HGB BLD-MCNC: 12.3 G/DL — SIGNIFICANT CHANGE UP (ref 11.5–15.5)
IMM GRANULOCYTES NFR BLD AUTO: SIGNIFICANT CHANGE UP % (ref 0–1.5)
LYMPHOCYTES # BLD AUTO: SIGNIFICANT CHANGE UP % (ref 13–44)
LYMPHOCYTES # BLD AUTO: SIGNIFICANT CHANGE UP K/UL (ref 1–3.3)
MCHC RBC-ENTMCNC: 31 PG — SIGNIFICANT CHANGE UP (ref 27–34)
MCHC RBC-ENTMCNC: 32.4 GM/DL — SIGNIFICANT CHANGE UP (ref 32–36)
MCV RBC AUTO: 95.7 FL — SIGNIFICANT CHANGE UP (ref 80–100)
MONOCYTES # BLD AUTO: SIGNIFICANT CHANGE UP K/UL (ref 0–0.9)
MONOCYTES NFR BLD AUTO: SIGNIFICANT CHANGE UP % (ref 2–14)
NEUTROPHILS # BLD AUTO: SIGNIFICANT CHANGE UP K/UL (ref 1.8–7.4)
NEUTROPHILS NFR BLD AUTO: SIGNIFICANT CHANGE UP % (ref 43–77)
NRBC # BLD: 0 /100 WBCS — SIGNIFICANT CHANGE UP (ref 0–0)
PLATELET # BLD AUTO: 163 K/UL — SIGNIFICANT CHANGE UP (ref 150–400)
POTASSIUM SERPL-MCNC: 4.2 MMOL/L — SIGNIFICANT CHANGE UP (ref 3.5–5.3)
POTASSIUM SERPL-SCNC: 4.2 MMOL/L — SIGNIFICANT CHANGE UP (ref 3.5–5.3)
RBC # BLD: 3.97 M/UL — SIGNIFICANT CHANGE UP (ref 3.8–5.2)
RBC # FLD: 14.9 % — HIGH (ref 10.3–14.5)
SODIUM SERPL-SCNC: 143 MMOL/L — SIGNIFICANT CHANGE UP (ref 135–145)
WBC # BLD: 7.56 K/UL — SIGNIFICANT CHANGE UP (ref 3.8–10.5)
WBC # FLD AUTO: 7.56 K/UL — SIGNIFICANT CHANGE UP (ref 3.8–10.5)

## 2020-03-24 PROCEDURE — 82962 GLUCOSE BLOOD TEST: CPT

## 2020-03-24 PROCEDURE — 84436 ASSAY OF TOTAL THYROXINE: CPT

## 2020-03-24 PROCEDURE — 97110 THERAPEUTIC EXERCISES: CPT

## 2020-03-24 PROCEDURE — 84484 ASSAY OF TROPONIN QUANT: CPT

## 2020-03-24 PROCEDURE — 80061 LIPID PANEL: CPT

## 2020-03-24 PROCEDURE — 93005 ELECTROCARDIOGRAM TRACING: CPT

## 2020-03-24 PROCEDURE — 83735 ASSAY OF MAGNESIUM: CPT

## 2020-03-24 PROCEDURE — 83550 IRON BINDING TEST: CPT

## 2020-03-24 PROCEDURE — 82607 VITAMIN B-12: CPT

## 2020-03-24 PROCEDURE — 80048 BASIC METABOLIC PNL TOTAL CA: CPT

## 2020-03-24 PROCEDURE — 82728 ASSAY OF FERRITIN: CPT

## 2020-03-24 PROCEDURE — 83540 ASSAY OF IRON: CPT

## 2020-03-24 PROCEDURE — 93880 EXTRACRANIAL BILAT STUDY: CPT

## 2020-03-24 PROCEDURE — 97530 THERAPEUTIC ACTIVITIES: CPT

## 2020-03-24 PROCEDURE — 80053 COMPREHEN METABOLIC PANEL: CPT

## 2020-03-24 PROCEDURE — 97163 PT EVAL HIGH COMPLEX 45 MIN: CPT

## 2020-03-24 PROCEDURE — 82550 ASSAY OF CK (CPK): CPT

## 2020-03-24 PROCEDURE — 99285 EMERGENCY DEPT VISIT HI MDM: CPT | Mod: 25

## 2020-03-24 PROCEDURE — 94640 AIRWAY INHALATION TREATMENT: CPT

## 2020-03-24 PROCEDURE — 84443 ASSAY THYROID STIM HORMONE: CPT

## 2020-03-24 PROCEDURE — 92610 EVALUATE SWALLOWING FUNCTION: CPT

## 2020-03-24 PROCEDURE — 70450 CT HEAD/BRAIN W/O DYE: CPT

## 2020-03-24 PROCEDURE — 83036 HEMOGLOBIN GLYCOSYLATED A1C: CPT

## 2020-03-24 PROCEDURE — 93306 TTE W/DOPPLER COMPLETE: CPT

## 2020-03-24 PROCEDURE — 97116 GAIT TRAINING THERAPY: CPT

## 2020-03-24 PROCEDURE — 36415 COLL VENOUS BLD VENIPUNCTURE: CPT

## 2020-03-24 PROCEDURE — 82746 ASSAY OF FOLIC ACID SERUM: CPT

## 2020-03-24 PROCEDURE — 84100 ASSAY OF PHOSPHORUS: CPT

## 2020-03-24 PROCEDURE — 85027 COMPLETE CBC AUTOMATED: CPT

## 2020-03-24 PROCEDURE — 71045 X-RAY EXAM CHEST 1 VIEW: CPT

## 2020-03-24 RX ADMIN — Medication 81 MILLIGRAM(S): at 12:42

## 2020-03-24 RX ADMIN — ENOXAPARIN SODIUM 40 MILLIGRAM(S): 100 INJECTION SUBCUTANEOUS at 12:42

## 2020-03-24 RX ADMIN — CLOPIDOGREL BISULFATE 75 MILLIGRAM(S): 75 TABLET, FILM COATED ORAL at 12:42

## 2020-03-24 NOTE — PROGRESS NOTE ADULT - SUBJECTIVE AND OBJECTIVE BOX
PGY1 Note discussed with supervising resident and primary attending.    Patient is a 85y old  Female who presents with a chief complaint of Right Hand Weakness (23 Mar 2020 13:30)      INTERVAL HPI/OVERNIGHT EVENTS: Patient seen and examined at bedside. No acute events.     MEDICATIONS  (STANDING):  aspirin  chewable 81 milliGRAM(s) Oral daily  atorvastatin 80 milliGRAM(s) Oral at bedtime  clopidogrel Tablet 75 milliGRAM(s) Oral daily  enoxaparin Injectable 40 milliGRAM(s) SubCutaneous daily    MEDICATIONS  (PRN):  acetaminophen   Tablet .. 650 milliGRAM(s) Oral every 6 hours PRN Temp greater or equal to 38C (100.4F), Moderate Pain (4 - 6)  ALBUTerol    90 MICROgram(s) HFA Inhaler 2 Puff(s) Inhalation every 6 hours PRN Shortness of Breath and/or Wheezing      Allergies    No Known Allergies    Intolerances        REVIEW OF SYSTEMS:  CONSTITUTIONAL: No fever, weight loss, or fatigue  RESPIRATORY: No cough, wheezing, chills or hemoptysis; No shortness of breath  CARDIOVASCULAR: No chest pain, palpitations, dizziness, or leg swelling  GASTROINTESTINAL: No abdominal or epigastric pain. No nausea, vomiting, or hematemesis; No diarrhea or constipation. No melena or hematochezia.  NEUROLOGICAL: No headaches, memory loss, loss of strength, numbness, or tremors  SKIN: No itching, burning, rashes, or lesions     Vital Signs Last 24 Hrs  T(C): 37.1 (24 Mar 2020 07:43), Max: 37.1 (24 Mar 2020 05:30)  T(F): 98.7 (24 Mar 2020 07:43), Max: 98.7 (24 Mar 2020 05:30)  HR: 82 (24 Mar 2020 07:43) (82 - 108)  BP: 136/77 (24 Mar 2020 07:43) (120/62 - 162/72)  BP(mean): --  RR: 18 (24 Mar 2020 07:43) (18 - 19)  SpO2: 95% (24 Mar 2020 07:43) (95% - 100%)    PHYSICAL EXAM:  GENERAL: NAD, well-groomed, well-developed  HEAD:  Atraumatic, Normocephalic  EYES: EOMI, PERRLA, conjunctiva and sclera clear  NECK: Supple, No JVD, Normal thyroid  CHEST/LUNG: Clear to percussion bilaterally; No rales, rhonchi, wheezing, or rubs  HEART: Regular rate and rhythm; No murmurs, rubs, or gallops  ABDOMEN: Soft, Nontender, Nondistended; Bowel sounds present  NERVOUS SYSTEM:  Alert & Oriented X3, Good concentration; Motor Strength 5/5 B/L   EXTREMITIES:  2+ Peripheral Pulses, No clubbing, cyanosis, or edema  SKIN;    LABS:                        12.3   7.56  )-----------( 163      ( 24 Mar 2020 06:25 )             38.0     03-24    143  |  105  |  11  ----------------------------<  99  4.2   |  x   |  0.72    Ca    8.9      24 Mar 2020 06:25  Phos  3.1     03-23  Mg     2.4     03-23          CAPILLARY BLOOD GLUCOSE          RADIOLOGY & ADDITIONAL TESTS:    Imaging Personally Reviewed:  [ ] YES  [ ] NO    Consultant(s) Notes Reviewed:  [ ] YES  [ ] NO

## 2020-03-24 NOTE — PROGRESS NOTE ADULT - PROBLEM SELECTOR PLAN 1
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

## 2020-03-24 NOTE — DISCHARGE NOTE NURSING/CASE MANAGEMENT/SOCIAL WORK - PATIENT PORTAL LINK FT
You can access the FollowMyHealth Patient Portal offered by [facility] by registering at the following website: [email or web address]. By joining [facility]’s FollowMyHealth portal, you will also be able to view your health information using other applications (apps) compatible with our system.

## 2020-03-25 ENCOUNTER — NON-APPOINTMENT (OUTPATIENT)
Age: 85
End: 2020-03-25

## 2020-03-25 DIAGNOSIS — I63.9 CEREBRAL INFARCTION, UNSPECIFIED: ICD-10-CM

## 2020-03-25 PROBLEM — Z00.00 ENCOUNTER FOR PREVENTIVE HEALTH EXAMINATION: Status: ACTIVE | Noted: 2020-03-25

## 2020-03-25 RX ORDER — ATORVASTATIN CALCIUM 80 MG/1
80 TABLET, FILM COATED ORAL
Refills: 0 | Status: ACTIVE | COMMUNITY
Start: 2020-03-25

## 2020-03-25 RX ORDER — CLOPIDOGREL 75 MG/1
75 TABLET, FILM COATED ORAL
Refills: 0 | Status: ACTIVE | COMMUNITY
Start: 2020-03-25

## 2020-03-25 RX ORDER — ASPIRIN 81 MG/1
81 TABLET, CHEWABLE ORAL
Refills: 0 | Status: ACTIVE | COMMUNITY
Start: 2020-03-25

## 2022-02-23 NOTE — CONSULT NOTE ADULT - PROVIDER SPECIALTY LIST ADULT
Refill request for:  spironolactone (ALDACTONE) 100 MG tablet 90 tablet 11 9/22/2020     Sig - Route: Take 1 tablet by mouth daily. - Oral    Sent to pharmacy as: Spironolactone 100 MG Oral Tablet (ALDACTONE)    Class: Eprescribe      Last office visit: 06/21/2021  Next office visit: Not scheduled     Ok to refill?   Only call patient back if problems refilling medication.      Neurology

## 2022-08-15 NOTE — DISCHARGE NOTE NURSING/CASE MANAGEMENT/SOCIAL WORK - NSDCPEPTSTRK_GEN_ALL_CORE
Need for follow up after discharge/Prescribed medications/Risk factors for stroke/Stroke education booklet/Stroke warning signs and symptoms/Signs and symptoms of stroke/Call 911 for stroke/Stroke support groups for patients, families, and friends Dupixent Counseling: I discussed with the patient the risks of dupilumab including but not limited to eye infection and irritation, cold sores, injection site reactions, worsening of asthma, allergic reactions and increased risk of parasitic infection.  Live vaccines should be avoided while taking dupilumab. Dupilumab will also interact with certain medications such as warfarin and cyclosporine. The patient understands that monitoring is required and they must alert us or the primary physician if symptoms of infection or other concerning signs are noted.

## 2023-03-14 NOTE — CONSULT NOTE ADULT - CONSULT REQUESTED BY NAME
Jose Antonio Arnett MD Received call from Kaylan Heredia at [facility], P: [phone]. They are still reviewing patient's records. Clinical updates sent via [employer].